# Patient Record
Sex: FEMALE | Race: BLACK OR AFRICAN AMERICAN
[De-identification: names, ages, dates, MRNs, and addresses within clinical notes are randomized per-mention and may not be internally consistent; named-entity substitution may affect disease eponyms.]

---

## 2020-05-07 ENCOUNTER — HOSPITAL ENCOUNTER (INPATIENT)
Dept: HOSPITAL 35 - ER | Age: 55
LOS: 2 days | Discharge: HOME | DRG: 177 | End: 2020-05-09
Attending: INTERNAL MEDICINE | Admitting: INTERNAL MEDICINE
Payer: COMMERCIAL

## 2020-05-07 VITALS — SYSTOLIC BLOOD PRESSURE: 146 MMHG | DIASTOLIC BLOOD PRESSURE: 83 MMHG

## 2020-05-07 VITALS — DIASTOLIC BLOOD PRESSURE: 80 MMHG | SYSTOLIC BLOOD PRESSURE: 158 MMHG

## 2020-05-07 VITALS — BODY MASS INDEX: 29.07 KG/M2 | HEIGHT: 62.01 IN | WEIGHT: 160 LBS

## 2020-05-07 VITALS — SYSTOLIC BLOOD PRESSURE: 185 MMHG | DIASTOLIC BLOOD PRESSURE: 96 MMHG

## 2020-05-07 VITALS — SYSTOLIC BLOOD PRESSURE: 182 MMHG | DIASTOLIC BLOOD PRESSURE: 90 MMHG

## 2020-05-07 VITALS — SYSTOLIC BLOOD PRESSURE: 146 MMHG | DIASTOLIC BLOOD PRESSURE: 87 MMHG

## 2020-05-07 DIAGNOSIS — I16.0: ICD-10-CM

## 2020-05-07 DIAGNOSIS — U07.1: Primary | ICD-10-CM

## 2020-05-07 DIAGNOSIS — Z79.01: ICD-10-CM

## 2020-05-07 DIAGNOSIS — J98.11: ICD-10-CM

## 2020-05-07 DIAGNOSIS — J12.89: ICD-10-CM

## 2020-05-07 DIAGNOSIS — Z79.899: ICD-10-CM

## 2020-05-07 DIAGNOSIS — Z86.19: ICD-10-CM

## 2020-05-07 DIAGNOSIS — I26.99: ICD-10-CM

## 2020-05-07 LAB
ALBUMIN SERPL-MCNC: 3.5 G/DL (ref 3.4–5)
ALT SERPL-CCNC: 19 U/L (ref 30–65)
ANION GAP SERPL CALC-SCNC: 5 MMOL/L (ref 7–16)
APTT BLD: 26.9 SECONDS (ref 24.5–32.8)
AST SERPL-CCNC: 19 U/L (ref 15–37)
BASOPHILS NFR BLD AUTO: 1 % (ref 0–2)
BILIRUB SERPL-MCNC: 0.6 MG/DL
BUN SERPL-MCNC: 7 MG/DL (ref 7–18)
CALCIUM SERPL-MCNC: 8.8 MG/DL (ref 8.5–10.1)
CHLORIDE SERPL-SCNC: 104 MMOL/L (ref 98–107)
CO2 SERPL-SCNC: 31 MMOL/L (ref 21–32)
CREAT SERPL-MCNC: 0.8 MG/DL (ref 0.6–1)
EOSINOPHIL NFR BLD: 1.4 % (ref 0–3)
ERYTHROCYTE [DISTWIDTH] IN BLOOD BY AUTOMATED COUNT: 14.4 % (ref 10.5–14.5)
GLUCOSE SERPL-MCNC: 96 MG/DL (ref 74–106)
GRANULOCYTES NFR BLD MANUAL: 62.1 % (ref 36–66)
HCT VFR BLD CALC: 35.6 % (ref 37–47)
HGB BLD-MCNC: 12.3 GM/DL (ref 12–15)
INR PPP: 1
LIPASE: 82 U/L (ref 73–393)
LYMPHOCYTES NFR BLD AUTO: 27.4 % (ref 24–44)
MCH RBC QN AUTO: 32.4 PG (ref 26–34)
MCHC RBC AUTO-ENTMCNC: 34.4 G/DL (ref 28–37)
MCV RBC: 94.2 FL (ref 80–100)
MONOCYTES NFR BLD: 8.1 % (ref 1–8)
NEUTROPHILS # BLD: 5.1 THOU/UL (ref 1.4–8.2)
PLATELET # BLD: 375 THOU/UL (ref 150–400)
POTASSIUM SERPL-SCNC: 3.2 MMOL/L (ref 3.5–5.1)
PROT SERPL-MCNC: 7.8 G/DL (ref 6.4–8.2)
PROTHROMBIN TIME: 10.7 SECONDS (ref 9.3–11.4)
RBC # BLD AUTO: 3.78 MIL/UL (ref 4.2–5)
SODIUM SERPL-SCNC: 140 MMOL/L (ref 136–145)
TROPONIN I SERPL-MCNC: <0.06 NG/ML (ref ?–0.06)
WBC # BLD AUTO: 8.2 THOU/UL (ref 4–11)

## 2020-05-07 PROCEDURE — 10879: CPT

## 2020-05-07 PROCEDURE — 10779: CPT

## 2020-05-07 NOTE — NUR
ATTEMPTED TO CALL REPORT AT THIS TIME.  I WAS INFORMED THAT "NOONE CAN TAKE
REPORT UNTIL EVENING SHIFT ARRIVES AND ARE NOT HERE YET."  I WAS ALSO INFORMED
THAT THE CHARGE NURSE IS NOT AVAILABLE. ED CHARGE VICENTE WAS NOTIFIED.

## 2020-05-08 VITALS — SYSTOLIC BLOOD PRESSURE: 150 MMHG | DIASTOLIC BLOOD PRESSURE: 97 MMHG

## 2020-05-08 VITALS — SYSTOLIC BLOOD PRESSURE: 151 MMHG | DIASTOLIC BLOOD PRESSURE: 83 MMHG

## 2020-05-08 VITALS — SYSTOLIC BLOOD PRESSURE: 157 MMHG | DIASTOLIC BLOOD PRESSURE: 88 MMHG

## 2020-05-08 VITALS — DIASTOLIC BLOOD PRESSURE: 77 MMHG | SYSTOLIC BLOOD PRESSURE: 125 MMHG

## 2020-05-08 VITALS — SYSTOLIC BLOOD PRESSURE: 146 MMHG | DIASTOLIC BLOOD PRESSURE: 83 MMHG

## 2020-05-08 LAB
ANION GAP SERPL CALC-SCNC: 9 MMOL/L (ref 7–16)
BUN SERPL-MCNC: 7 MG/DL (ref 7–18)
CALCIUM SERPL-MCNC: 8.3 MG/DL (ref 8.5–10.1)
CHLORIDE SERPL-SCNC: 103 MMOL/L (ref 98–107)
CO2 SERPL-SCNC: 27 MMOL/L (ref 21–32)
CREAT SERPL-MCNC: 0.9 MG/DL (ref 0.6–1)
GLUCOSE SERPL-MCNC: 124 MG/DL (ref 74–106)
MAGNESIUM SERPL-MCNC: 2.3 MG/DL (ref 1.8–2.4)
POTASSIUM SERPL-SCNC: 4.3 MMOL/L (ref 3.5–5.1)
SODIUM SERPL-SCNC: 139 MMOL/L (ref 136–145)

## 2020-05-08 NOTE — NUR
PT IS ALERT AND ORIENTED X4. BP MODERATELY ELEVATED , LOW GRADE TEMP. SATS
WNL.  PT HAD 5 BTS OF VTACH. PT ASYMPTOMATIC. DENIED CHEST PAIN. NOTIFIED
RUSSELL THOMPSON OF VTACH. SHE DID NOT WANT LABS DRAWN AND SAID TO CONTINUE TO
MONITOR PT FOR CHANGES. HRR PRESENTLY. NO S/S BLEEDING NOTED. NO COUGHING
NOTED. PT C/O OF SINUS AREA FEELING HOT. NOTIFIED RUSSELL THOMPSON. STARTED PT
ON CLARITIN AS ORDERED. BED DOEN CALL LIGHT IN REACH. INSTRUCTED PT ON FALL
PRECAUTIONS.

## 2020-05-08 NOTE — NUR
INITIAL ASSESSMENT:
Consult received. THUY reviewed chart and spoke with nursing and attending
physician. Pt is in Enhanced Isolation due to testing positive for COVID-19
prior to admission. Repeat test is pending. Pt may discharge home over the
weekend and will be started on Xarelto. THUY placed call to pt's room. No
answer. THUY left voice message on pt's cell phone to request call back, so
insurance coverage can be checked for Xarelto. Awaiting call back at this
time. Per chart, pt is alert/orientated x 4. Pt lives at home. PCP is Dr. Florencio Parisi. THUY is following to assist as needed with discharge planning.

## 2020-05-08 NOTE — NUR
Pt has been resting quietly. VSS afebrile this am. Denied need for pain
medicine. No c/o chest pain this am.

## 2020-05-08 NOTE — NUR
ASSUMED CARE OF PT AT 0700.   PT AOX4 IN NO ACUTE DISTRESS.  DENIES NEED FOR
ANALGESICS.  COMPLAINS ABOUT QUALITY OF FOOD, OTHERWISE VOICING NO COMPLAINTS.
UP AD KENIA. STEADY GAIT.  COVID19 SWAB POSITIVE.  ID CONSULTED.  CALLS OUT
APPROPRIATELY.  WILL CONT TO MONITOR.

## 2020-05-08 NOTE — NUR
PT IS ALERT AND ORIENTED X4. RECEIVED PT FROM ER TO ROOM 350 AT APPROXIMATELY
2030 PM. VSS TEMPS 99.2-100.1. SATS WNL ON RA. LUNGS ARE
CLEAR WITH DIMINSHED BASES. PT C/O LEFT SIDED NONCARDIAC CHEST PAIN. REFUSED
MORPHINE OR HYDROCODONE FOR PAIN. SHE STATED IT ONLY HURTS HER WHEN SHE TAKES
DEEP BREATHS. TROPONIN IN ER <0.06. PT HAD 10 BTS V-TACH AROUND 2352. PT
SLEEPING AT TIME. DENIED PAIN. ASYMPTOMATIC. MICHELLE THOMPSON NOTIFIED. STAT LABS
ORDERED. K 4.3 AND MG 2.3 WNL.  DR IN ER AWARE OF BORDERLINE SHORT TX INTERVAL
AND EKG ABNORMALITES AS DOCUMENTED IN H&P. CT + PE. PT STARTED ON ANTICOAG. NO
S/S BLEEDING NOTED SO FAR. SCDS ON ABELINO LE'S. WILL CONTINUE TO MONIOR PT FOR
CHANGES.

## 2020-05-08 NOTE — EKG
[facility]
[address]
[place]   [address]                     ELECTROCARDIOGRAM REPORT      
_______________________________________________________________________________
 
Name:       LAKHWINDER WESTNHUNG HENRY            Room #:         350-P       ADM IN  
M.R.#:      [id number]                       Account #:      [id number]  
Admission:  20    Attend Phys:    Zita Dunn
Discharge:              Date of Birth:  [birth year]  
                                                          Report #: [id number]
                                                                    [id number]
_______________________________________________________________________________
THIS REPORT FOR:  
 
cc:  Florencio Parisi MD, David A. MD Lundgren,Wellington HERMOSILLO MD [facility]                                        ~
THIS REPORT FOR:   //name//                          
 
                         [facility] ED
                                       
Test Date:    2020               Test Time:    11:45:37
Pat Name:     CHASITY WEST         Department:   
Patient ID:   [id number]            Room:         350
Gender:       F                        Technician:   CHYNA
:          1965               Requested By: Rei Damian
Order Number: [id number] MD:   Wellington Rivas
                                 Measurements
Intervals                              Axis          
Rate:         88                       P:            66
SD:           114                      QRS:          1
QRSD:         90                       T:            -8
QT:           433                                    
QTc:          524                                    
                           Interpretive Statements
Sinus rhythm
Borderline short SD interval
Left ventricular hypertrophy
Anterior Q waves, possibly due to LVH
Borderline T abnormalities
Prolonged QT interval
No previous ECG available for comparison
 
Electronically Signed On 2020 7:42:41 CDT by Wellington Rivas
[email or web address]
 
 
 
 
 
 
 
 
 
 
 
  <ELECTRONICALLY SIGNED>
   By: Wellington Rivas MD, [facility]   
  20     0742
D: 20 1145                           _____________________________________
T: 20 1145                           Wellington Rivas MD, [facility]     /EPI

## 2020-05-09 VITALS — SYSTOLIC BLOOD PRESSURE: 137 MMHG | DIASTOLIC BLOOD PRESSURE: 78 MMHG

## 2020-05-09 VITALS — SYSTOLIC BLOOD PRESSURE: 153 MMHG | DIASTOLIC BLOOD PRESSURE: 80 MMHG

## 2020-05-09 VITALS — DIASTOLIC BLOOD PRESSURE: 77 MMHG | SYSTOLIC BLOOD PRESSURE: 143 MMHG

## 2020-05-09 VITALS — DIASTOLIC BLOOD PRESSURE: 78 MMHG | SYSTOLIC BLOOD PRESSURE: 137 MMHG

## 2020-05-09 NOTE — NUR
Pt progressing slowly towards d/c goals. VSS Afebrile this am. Lungs clear and
unlabored. Pt stated her sinuses feel clearer this am and her eyes burned
less. Denied chest pain or SOA. No further episode of v-tach noted so far. No
bleeding noted. Will continue to monitor pt for changes. Scds on.

## 2020-05-09 NOTE — NUR
ASSUMED CARE OF PT AT 0700. PT ALERT AND ORIENTED X4 IN NO ACUTE DISTRESS.
ASYMPTOMATIC.  DENIES DIARRHEA.  D/C ORDERS PENDING.  NO CHANGES TO REPORT.